# Patient Record
(demographics unavailable — no encounter records)

---

## 2024-11-13 NOTE — HISTORY OF PRESENT ILLNESS
[FreeTextEntry1] :  Here for follow up for weight management  breakfast:  egg and cheese sandwich: take out :  coffee: creamer: about 2T water  snack  none today  +/- apple  lunch:  none  dinner: 5:30  general  chicken with brown rice and veggies drink water  or  doesnt remember  ETOH none Gym daily cardio and wts: to increase  his cardio on eliptical  : about 60 min and some wt s  sleep: 1am to 9 am   eating out 2x a week:

## 2024-11-13 NOTE — ASSESSMENT
[FreeTextEntry1] : 29 year old male with a history of morbid obesity, n VITOR and anxiety and depression  starting weight of 320 lbs in 5/22 now 255  On Ozempic 2mg being ordered and followed by his PCP who needs to   1 cont excellent exercise : doing cardio and wts: elipitical and to add more wts  2 get to sleep earlier has improved: was 2-2:30 to 10 now much improved  now going to bed at about 1 am  3 stop " cheating" every weekend: Limit going out to eat 1x week only , to try to limit  to once a week now in general , almost 2x-3x week limit to 1x week  4 eat non starchy vegetables at lunch and dinner, have frozen avail, needs to eat a more whole food diet, not prepared foods, needs to eat more home cooked meals, skim + milk or skim, or anything better than whole milk, no sugary cereals, plain cheerios with fruit on it , reg oatmeal not instant, start cooking and having it ready. like  5 cut out the sweetener in the coffee, sweetened creamer should be enough done, cut down on the creamer 6 Ozempic per his pcp 7 follow up with the nutritionist done   seen in Oct  8 doing more cooking on his own foods.  9 gets his ozempic from his PCP     lab review 2.6.24   Pt is a 28 year old Non- or   M, whose current wt is 263# (BMI:_38.74; BF%: _%; Ht: _70").  Current comorbidites: _   LABS - DATE: _2.5.24 HgA1C:5.3 Na:138 K:4.2 Glucose:80 BUN:13 Crea:0.93 Albumin:4.5 Vit D:20.2 Triglycerides:212 Cholesterol:167 HDL:39 LDL:92   DIETING HISTORY Prior Diets:n History of Bariatric Surgery:n History of Weight Loss Medications ozempic Complications & Side Effects of Anti-Obesity Medications:n

## 2024-11-13 NOTE — PHYSICAL EXAM
[Obese, well nourished, in no acute distress] : obese, well nourished, in no acute distress [de-identified] : flat affect

## 2024-11-13 NOTE — REASON FOR VISIT
[Follow-Up] : a follow-up visit [FreeTextEntry1] :  The patient consents to this telehealth visit, is in [facility], I am in [place], [place], This service was provided using 2-way audiovisual technology. The patient and provider provider participated in this telehealth visit.

## 2025-01-22 NOTE — HISTORY OF PRESENT ILLNESS
[FreeTextEntry1] : Here for follow up for wt management.  Breakfast:  yogurt with banana and granola drink: coffee: creamer 2T  snack  none  Lunch PB&J on 647 bread: 1 sandwich drink water dessert none  snack  none   dinner:  7 meatloaf with mashed potatoes and green beans small side drink: water dessert: none  Exer: daily cardio and wts  bed: 1 am  up at 9 am  ETOH none  Out to eat: 1x a week: cheeseburger with lettuce and tomatoes, side of fries and water and  no dessert:

## 2025-01-22 NOTE — ASSESSMENT
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  On Ozempic 2mg being ordered and followed by his PCP who needs to   1 cont excellent exercise : doing cardio and wts: elipitical and to add more wts  2 get to sleep earlier has improved: was 1a to 9 am now much improved  was 2-3 am  3 stop " cheating" every weekend: Limit going out to eat 1x week only , to try to limit  to once a week now in general , almost 2x-3x week limit to 1x week  4 eat non starchy vegetables at lunch and dinner, have frozen avail, needs to eat a more whole food diet, not prepared foods, needs to eat more home cooked meals, skim + milk or skim, or anything better than whole milk, no sugary cereals, plain cheerios with fruit on it , reg oatmeal not instant, start cooking and having it ready. like  5 now only on  sweetened creamer instead of added sugar, 2T  6 Ozempic per his pcp 2 mg weekly 7 follow up with the nutritionist done   12.16.24 8 doing more cooking on his own foods. working in food prep 1x a week 9 gets his ozempic from his PCP        Pt is a 28 year old Non- or   M, whose current wt is 263# (BMI:_38.74; BF%: _%; Ht: _70").  Current comorbidites: _   LABS - DATE: _2.5.24 HgA1C:5.3 Na:138 K:4.2 Glucose:80 BUN:13 Crea:0.93 Albumin:4.5 Vit D:20.2 Triglycerides:212 Cholesterol:167 HDL:39 LDL:92   DIETING HISTORY Prior Diets:n History of Bariatric Surgery:n History of Weight Loss Medications ozempic Complications & Side Effects of Anti-Obesity Medications:n

## 2025-01-22 NOTE — REASON FOR VISIT
[Follow-Up] : a follow-up visit [FreeTextEntry1] :  The patient consents to this telehealth visit, is in [facility], I am in [place], [place], This service was provided using 2-way audiovisual technology. The patient and provider provider participated in this telehealth visit.

## 2025-03-05 NOTE — PHYSICAL EXAM
[Obese, well nourished, in no acute distress] : obese, well nourished, in no acute distress [de-identified] : flat affect

## 2025-03-05 NOTE — HISTORY OF PRESENT ILLNESS
[FreeTextEntry1] :  Here for follow up for weight management. 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 Continues on the Ozempic 2 mg  Breakfast:  yogurt and granola( 3T)  drink coffee: creamer  snack  apple  lunch  Hot Dog with a bun and salad( ranch dressing) just veggies  drink: water  snack  none   Dinner: 6:30  chili: with chicken beans tomatoes and corn and a salad drink water dessert none  snack  none   Exer: daily   Bed: 1- 1:30 am up at 9-9:30  Eating out 1x a week  ETOH none

## 2025-03-05 NOTE — REASON FOR VISIT
[Follow-Up] : a follow-up visit [Parent] : parent [FreeTextEntry1] :  The patient consents to this telehealth visit, is in   [place], [place], I am in [place], [place], This service was provided using 2-way audiovisual technology. The patient and provider provider participated in this telehealth visit.

## 2025-03-05 NOTE — ASSESSMENT
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  4 eat non starchy vegetables at lunch and dinner, have frozen avail, needs to eat a more whole food diet, not prepared foods, needs to eat more home cooked meals, skim + milk or skim, or anything better than whole milk, no sugary cereals, plain cheerios with fruit on it , reg oatmeal not instant, start cooking and having it ready. like  5 now only on  sweetened creamer instead of added sugar, 2T  6 Ozempic per his pcp 2 mg weekly 7 Not doing his own cooking, mother is doing the cooking. Rec doing more of his own cooking. More and more salad and non starchy vegetables with limited ranch dressing.  8 doing more cooking on his own foods. working in food prep 1x a week 9 gets his ozempic from his PCP        Pt is a 28 year old Non- or   M, whose current wt is 263# (BMI:_38.74; BF%: _%; Ht: _70").  Current comorbidites: _   LABS - DATE: _2.5.24 HgA1C:5.3 Na:138 K:4.2 Glucose:80 BUN:13 Crea:0.93 Albumin:4.5 Vit D:20.2 Triglycerides:212 Cholesterol:167 HDL:39 LDL:92   DIETING HISTORY Prior Diets:n History of Bariatric Surgery:n History of Weight Loss Medications ozempic Complications & Side Effects of Anti-Obesity Medications:n

## 2025-04-30 NOTE — PHYSICAL EXAM
[Obese, well nourished, in no acute distress] : obese, well nourished, in no acute distress [de-identified] : flat affect

## 2025-04-30 NOTE — ASSESSMENT
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  4 eat non starchy vegetables at lunch and dinner, have frozen avail, needs to eat a more whole food diet, not prepared foods, needs to eat more home cooked meals, skim + milk or skim, or anything better than whole milk, no sugary cereals, plain cheerios with fruit on it , reg oatmeal not instant, start cooking and having it ready. like  5 now only on  sweetened creamer instead of added sugar, 2T .Try to eat veggies with lunch and dinner, madelyn  when " cheating"  6 Mounjaro 7.5  per his pcp  weekly 7 Not doing his own cooking, mother is doing the cooking. Rec doing more of his own cooking. More and more salad and non starchy vegetables with limited ranch dressing.          Pt is a 28 year old Non- or   M, whose current wt is 255# (BMI:_38.74; BF%: _%; Ht: _70").  Current comorbidites: _   LABS - DATE: _2.5.24 HgA1C:5.3 Na:138 K:4.2 Glucose:80 BUN:13 Crea:0.93 Albumin:4.5 Vit D:20.2 Triglycerides:212 Cholesterol:167 HDL:39 LDL:92   DIETING HISTORY Prior Diets:n History of Bariatric Surgery:n History of Weight Loss Medications ozempic Complications & Side Effects of Anti-Obesity Medications:n

## 2025-04-30 NOTE — REASON FOR VISIT
[Follow-Up] : a follow-up visit [FreeTextEntry1] :  The patient consents to this telehealth visit, is in [facility], I am in [place], [place], This service was provided using 2-way audiovisual technology. The patient and provider provider participated in this telehealth visit.

## 2025-04-30 NOTE — HISTORY OF PRESENT ILLNESS
[FreeTextEntry1] :  Here for follow up for weight management. Last visit on 3.5.25 and wt is 255 and stable.  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   Breakfast:  toast x 2: butter  coffee: creamer 1 T  snack  none  Lunch  omelet: 1 egg, 1 slice of cheese, salad( Italian dressing)  drink coffee with creamer  snack  none   dinner: spaghetti and meatballs(  x6) no veggies,  or  baked lemon chicken with a side of mac and cheese and salad drink: water  snack :  crackers  ETOH none   .Eating out/Ordering In: 1-2x a week   Exer: goes to the gym daily   bed: 1am up at 9 am

## 2025-06-04 NOTE — PHYSICAL EXAM
[Obese, well nourished, in no acute distress] : obese, well nourished, in no acute distress [de-identified] : flat affect

## 2025-06-04 NOTE — ASSESSMENT
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  4 eat non starchy vegetables at lunch and dinner, have frozen avail, needs to eat a more whole food diet, not prepared foods, needs to eat more home cooked meals, skim + milk or skim, or anything better than whole milk, no sugary cereals, plain cheerios with fruit on it , reg oatmeal not instant, start cooking and having it ready. like  5 now only on  sweetened creamer instead of added sugar, 2T .Try to eat veggies with lunch and dinner, madelyn  when " cheating"  6 Mounjaro 7.5  per his pcp  weekly 7 Not doing his own cooking, mother is doing the cooking. Rec doing more of his own cooking. More and more salad and non starchy vegetables with limited ranch dressing.          Pt is a 28 year old Non- or   M, whose current wt is 254# (BMI:_38.74; BF%: _%; Ht: _70").  Current comorbidites: _   LABS - DATE: _2.5.24 HgA1C:5.3 Na:138 K:4.2 Glucose:80 BUN:13 Crea:0.93 Albumin:4.5 Vit D:20.2 Triglycerides:212 Cholesterol:167 HDL:39 LDL:92   DIETING HISTORY Prior Diets:n History of Bariatric Surgery:n History of Weight Loss Medications ozempic Complications & Side Effects of Anti-Obesity Medications:n

## 2025-06-04 NOTE — HISTORY OF PRESENT ILLNESS
[FreeTextEntry1] :  Here for follow up for weight management, last seen on 4.30.25 and his wt was 255, today it is 254  breakfast:  bowl with eggs, hash and sausage reportedly low in calories  coffee: creamer  snack  none   Lunch protein shake  snack  large salad: Italian dressing a few tablespoons  dinner: 4 -7 Meatloaf, mashed potatoes and green beans.   exer gym daily  ETOH none  bed: 1 to 9

## 2025-06-04 NOTE — REASON FOR VISIT
[Follow-Up] : a follow-up visit [FreeTextEntry1] :  The patient consents to this telehealth visit, is in  [place], [place], I am in [place], [place], This service was provided using 2-way audiovisual technology. The patient and provider provider participated in this telehealth visit.

## 2025-07-28 NOTE — REASON FOR VISIT
[Follow-Up] : a follow-up visit [FreeTextEntry1] :  The patient consents to this telehealth visit, is in [facility], I am in [place], [place], This service was provided using 2-way audiovisual technology. The patient and provider provider participated in this telehealth visit.

## 2025-07-28 NOTE — ASSESSMENT
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  Added salad to the Pizza  4 eat non starchy vegetables at lunch and dinner, have frozen avail, needs to eat a more whole food diet, not prepared foods, needs to eat more home cooked meals, skim + milk or skim, or anything better than whole milk, no sugary cereals, plain cheerios with fruit on it , reg oatmeal not instant, start cooking and having it ready. like  5 now only on  sweetened creamer instead of added sugar, 2T .Try to eat veggies with lunch and dinner, madelyn  when " cheating"  6 Mounjaro 10  per his pcp  weekly now for about  6 weeks 7 Not doing his own cooking, mother is doing the cooking. Rec doing more of his own cooking. More and more salad and non starchy vegetables with limited ranch dressing.          Pt is a 28 year old Non- or   M, whose current wt is 254# (BMI:_38.74; BF%: _%; Ht: _70").  Current comorbidites: _   LABS - DATE: _2.5.24 HgA1C:5.3 Na:138 K:4.2 Glucose:80 BUN:13 Crea:0.93 Albumin:4.5 Vit D:20.2 Triglycerides:212 Cholesterol:167 HDL:39 LDL:92   DIETING HISTORY Prior Diets:n History of Bariatric Surgery:n History of Weight Loss Medications ozempic Complications & Side Effects of Anti-Obesity Medications:n

## 2025-07-28 NOTE — HISTORY OF PRESENT ILLNESS
[FreeTextEntry1] :  Here for follow up for weight management, last seen on 6.4.25 and his weight has stayed stable   Breakfast:  pancakes with syrup, no fruits, coffee or  muffins, yogurts, scrambled eggs always with coffee  snack none   Lunch:  breakfast was more of a brunch   dinner: 5 pizza: slices x 3, drink: water salad  dessert: none    snack : junk food: chips, usu about just prior to bed  at about 1 am  exer: has increased due to poor eating ETOH none .Eating out/Ordering In  1-2x a week  sleep 1am to 9 am